# Patient Record
Sex: FEMALE | Race: BLACK OR AFRICAN AMERICAN | URBAN - METROPOLITAN AREA
[De-identification: names, ages, dates, MRNs, and addresses within clinical notes are randomized per-mention and may not be internally consistent; named-entity substitution may affect disease eponyms.]

---

## 2020-09-16 ENCOUNTER — OFFICE VISIT (OUTPATIENT)
Dept: URBAN - METROPOLITAN AREA TELEHEALTH 2 | Facility: TELEHEALTH | Age: 60
End: 2020-09-16

## 2022-10-31 ENCOUNTER — LAB OUTSIDE AN ENCOUNTER (OUTPATIENT)
Dept: URBAN - METROPOLITAN AREA CLINIC 17 | Facility: CLINIC | Age: 62
End: 2022-10-31

## 2022-10-31 ENCOUNTER — OFFICE VISIT (OUTPATIENT)
Dept: URBAN - METROPOLITAN AREA CLINIC 17 | Facility: CLINIC | Age: 62
End: 2022-10-31
Payer: COMMERCIAL

## 2022-10-31 ENCOUNTER — WEB ENCOUNTER (OUTPATIENT)
Dept: URBAN - METROPOLITAN AREA CLINIC 17 | Facility: CLINIC | Age: 62
End: 2022-10-31

## 2022-10-31 VITALS
SYSTOLIC BLOOD PRESSURE: 150 MMHG | WEIGHT: 176.4 LBS | BODY MASS INDEX: 29.39 KG/M2 | HEIGHT: 65 IN | HEART RATE: 66 BPM | TEMPERATURE: 97.1 F | DIASTOLIC BLOOD PRESSURE: 95 MMHG

## 2022-10-31 DIAGNOSIS — Z12.11 SCREEN FOR COLON CANCER: ICD-10-CM

## 2022-10-31 DIAGNOSIS — R05.3 CHRONIC COUGH: ICD-10-CM

## 2022-10-31 DIAGNOSIS — Z86.010 PERSONAL HISTORY OF COLONIC POLYPS: ICD-10-CM

## 2022-10-31 DIAGNOSIS — Z80.0 FAMILY HISTORY OF COLON CANCER: ICD-10-CM

## 2022-10-31 PROCEDURE — 99204 OFFICE O/P NEW MOD 45 MIN: CPT | Performed by: INTERNAL MEDICINE

## 2022-10-31 RX ORDER — POLYETHYLENE GLYCOL-3350, SODIUM CHLORIDE, POTASSIUM CHLORIDE AND SODIUM BICARBONATE 420; 11.2; 5.72; 1.48 G/438.4G; G/438.4G; G/438.4G; G/438.4G
AS DIRECTED POWDER, FOR SOLUTION ORAL ONCE
Qty: 1 KIT | Refills: 0 | OUTPATIENT
Start: 2022-10-31 | End: 2022-11-01

## 2022-10-31 NOTE — HPI-TODAY'S VISIT:
10/31/22 63 yo lady pt of Dr Steve Rojas Needs a colonoscopy.  Last in 2014 - she has her post op papers showing SC polyp and IH Says great grand mother had colon CA. No blood in stool. no constipation.  Has some acid reflux "they want me to have an EGD because I cough all the time".  Has seen pulmonary 6 or 7 years ago 'he didnt see anything". Also has seen allergist.  Says "I dont know the feeling of reflux". Denies HB and regurgitation.  Cough is not related to time. Eats dinner about 8 pm. Sometimes will snack Endorses post nasal drip. Says has seen ENT "he told me to take otc stuff".

## 2022-12-07 ENCOUNTER — CLAIMS CREATED FROM THE CLAIM WINDOW (OUTPATIENT)
Dept: URBAN - METROPOLITAN AREA SURGERY CENTER 16 | Facility: SURGERY CENTER | Age: 62
End: 2022-12-07

## 2022-12-07 ENCOUNTER — CLAIMS CREATED FROM THE CLAIM WINDOW (OUTPATIENT)
Dept: URBAN - METROPOLITAN AREA SURGERY CENTER 16 | Facility: SURGERY CENTER | Age: 62
End: 2022-12-07
Payer: COMMERCIAL

## 2022-12-07 DIAGNOSIS — Z86.010 ADENOMAS PERSONAL HISTORY OF COLONIC POLYPS: ICD-10-CM

## 2022-12-07 DIAGNOSIS — K22.2 ACQUIRED ESOPHAGEAL RING: ICD-10-CM

## 2022-12-07 DIAGNOSIS — K22.89 DILATATION OF ESOPHAGUS: ICD-10-CM

## 2022-12-07 PROCEDURE — 43239 EGD BIOPSY SINGLE/MULTIPLE: CPT | Performed by: INTERNAL MEDICINE

## 2022-12-07 PROCEDURE — G8907 PT DOC NO EVENTS ON DISCHARG: HCPCS | Performed by: INTERNAL MEDICINE

## 2022-12-07 PROCEDURE — G0105 COLORECTAL SCRN; HI RISK IND: HCPCS | Performed by: INTERNAL MEDICINE

## 2022-12-14 ENCOUNTER — TELEPHONE ENCOUNTER (OUTPATIENT)
Dept: URBAN - METROPOLITAN AREA CLINIC 92 | Facility: CLINIC | Age: 62
End: 2022-12-14

## 2022-12-14 RX ORDER — OMEPRAZOLE 40 MG/1
1 CAPSULE 30 MINUTES BEFORE MORNING MEAL CAPSULE, DELAYED RELEASE ORAL ONCE A DAY
Qty: 90 | Refills: 0 | OUTPATIENT
Start: 2022-12-14

## 2023-01-05 ENCOUNTER — OFFICE VISIT (OUTPATIENT)
Dept: URBAN - METROPOLITAN AREA CLINIC 17 | Facility: CLINIC | Age: 63
End: 2023-01-05
Payer: COMMERCIAL

## 2023-01-05 VITALS
HEART RATE: 78 BPM | BODY MASS INDEX: 29.39 KG/M2 | SYSTOLIC BLOOD PRESSURE: 148 MMHG | TEMPERATURE: 97.1 F | HEIGHT: 65 IN | DIASTOLIC BLOOD PRESSURE: 95 MMHG | WEIGHT: 176.4 LBS

## 2023-01-05 DIAGNOSIS — K21.00 GASTROESOPHAGEAL REFLUX DISEASE WITH ESOPHAGITIS WITHOUT HEMORRHAGE: ICD-10-CM

## 2023-01-05 DIAGNOSIS — R05.3 CHRONIC COUGH: ICD-10-CM

## 2023-01-05 DIAGNOSIS — Z12.11 SCREEN FOR COLON CANCER: ICD-10-CM

## 2023-01-05 DIAGNOSIS — Z86.010 PERSONAL HISTORY OF COLONIC POLYPS: ICD-10-CM

## 2023-01-05 DIAGNOSIS — Z80.0 FAMILY HISTORY OF COLON CANCER: ICD-10-CM

## 2023-01-05 PROCEDURE — 99214 OFFICE O/P EST MOD 30 MIN: CPT | Performed by: INTERNAL MEDICINE

## 2023-01-05 RX ORDER — OMEPRAZOLE 40 MG/1
1 CAPSULE 30 MINUTES BEFORE MORNING MEAL CAPSULE, DELAYED RELEASE ORAL ONCE A DAY
Qty: 90 | Refills: 0 | Status: ACTIVE | COMMUNITY
Start: 2022-12-14

## 2023-01-05 NOTE — HPI-TODAY'S VISIT:
10/31/22 61 yo lady pt of Dr Steve Rojas Needs a colonoscopy.  Last in 2014 - she has her post op papers showing SC polyp and IH Says great grand mother had colon CA. No blood in stool. no constipation.  Has some acid reflux "they want me to have an EGD because I cough all the time".  Has seen pulmonary 6 or 7 years ago 'he didnt see anything". Also has seen allergist.  Says "I dont know the feeling of reflux". Denies HB and regurgitation.  Cough is not related to time. Eats dinner about 8 pm. Sometimes will snack Endorses post nasal drip. Says has seen ENT "he told me to take otc stuff".  1/5/23 Here for f.u  Discussed EGD and colonoscopy Has been on PPI since - 40 % improved.  Eats dinner before 8 pm. - sits in bed but lies down 10.30 pm.

## 2023-02-20 ENCOUNTER — OFFICE VISIT (OUTPATIENT)
Dept: URBAN - METROPOLITAN AREA CLINIC 17 | Facility: CLINIC | Age: 63
End: 2023-02-20
Payer: COMMERCIAL

## 2023-02-20 ENCOUNTER — DASHBOARD ENCOUNTERS (OUTPATIENT)
Age: 63
End: 2023-02-20

## 2023-02-20 VITALS
TEMPERATURE: 98 F | WEIGHT: 171 LBS | SYSTOLIC BLOOD PRESSURE: 141 MMHG | DIASTOLIC BLOOD PRESSURE: 95 MMHG | HEART RATE: 58 BPM | BODY MASS INDEX: 28.49 KG/M2 | HEIGHT: 65 IN

## 2023-02-20 DIAGNOSIS — R05.3 CHRONIC COUGH: ICD-10-CM

## 2023-02-20 DIAGNOSIS — K21.00 GASTROESOPHAGEAL REFLUX DISEASE WITH ESOPHAGITIS WITHOUT HEMORRHAGE: ICD-10-CM

## 2023-02-20 DIAGNOSIS — Z12.11 SCREEN FOR COLON CANCER: ICD-10-CM

## 2023-02-20 DIAGNOSIS — Z80.0 FAMILY HISTORY OF COLON CANCER: ICD-10-CM

## 2023-02-20 DIAGNOSIS — Z86.010 PERSONAL HISTORY OF COLONIC POLYPS: ICD-10-CM

## 2023-02-20 PROBLEM — 266433003: Status: ACTIVE | Noted: 2023-01-05

## 2023-02-20 PROBLEM — 428283002: Status: ACTIVE | Noted: 2022-10-31

## 2023-02-20 PROCEDURE — 99214 OFFICE O/P EST MOD 30 MIN: CPT | Performed by: INTERNAL MEDICINE

## 2023-02-20 RX ORDER — OMEPRAZOLE 40 MG/1
1 CAPSULE 30 MINUTES BEFORE MORNING MEAL CAPSULE, DELAYED RELEASE ORAL ONCE A DAY
Qty: 90 | Refills: 0 | Status: ACTIVE | COMMUNITY
Start: 2022-12-14

## 2023-02-20 RX ORDER — OMEPRAZOLE 40 MG/1
1 CAPSULE 30 MINUTES BEFORE MORNING MEAL CAPSULE, DELAYED RELEASE ORAL TWICE A DAY
Qty: 180 | Refills: 3 | OUTPATIENT
Start: 2023-02-20

## 2023-02-20 NOTE — HPI-TODAY'S VISIT:
10/31/22 61 yo lady pt of Dr Steve Rojas Needs a colonoscopy.  Last in 2014 - she has her post op papers showing SC polyp and IH Says great grand mother had colon CA. No blood in stool. no constipation.  Has some acid reflux "they want me to have an EGD because I cough all the time".  Has seen pulmonary 6 or 7 years ago 'he didnt see anything". Also has seen allergist.  Says "I dont know the feeling of reflux". Denies HB and regurgitation.  Cough is not related to time. Eats dinner about 8 pm. Sometimes will snack Endorses post nasal drip. Says has seen ENT "he told me to take otc stuff".  1/5/23 Here for f.u  Discussed EGD and colonoscopy Has been on PPI since - 40 % improved.  Eats dinner before 8 pm. - sits in bed but lies down 10.30 pm.  2/20/23 Here for f/u Brings her post procedure paperwork from 2014 - notes SC polyp but no path.  She is doing well "I still have my little cough".  Says she is still at 40% improvement with cough. no reflux.

## 2023-05-22 ENCOUNTER — OFFICE VISIT (OUTPATIENT)
Dept: URBAN - METROPOLITAN AREA CLINIC 17 | Facility: CLINIC | Age: 63
End: 2023-05-22